# Patient Record
Sex: FEMALE | Race: WHITE | NOT HISPANIC OR LATINO | Employment: FULL TIME | URBAN - METROPOLITAN AREA
[De-identification: names, ages, dates, MRNs, and addresses within clinical notes are randomized per-mention and may not be internally consistent; named-entity substitution may affect disease eponyms.]

---

## 2024-11-25 ENCOUNTER — OFFICE VISIT (OUTPATIENT)
Dept: URGENT CARE | Facility: CLINIC | Age: 64
End: 2024-11-25
Payer: COMMERCIAL

## 2024-11-25 VITALS
TEMPERATURE: 97.5 F | DIASTOLIC BLOOD PRESSURE: 82 MMHG | SYSTOLIC BLOOD PRESSURE: 134 MMHG | RESPIRATION RATE: 12 BRPM | OXYGEN SATURATION: 99 % | HEART RATE: 60 BPM

## 2024-11-25 DIAGNOSIS — J01.00 ACUTE NON-RECURRENT MAXILLARY SINUSITIS: Primary | ICD-10-CM

## 2024-11-25 PROCEDURE — 99213 OFFICE O/P EST LOW 20 MIN: CPT | Performed by: PHYSICIAN ASSISTANT

## 2024-11-25 RX ORDER — METOPROLOL SUCCINATE 25 MG/1
12.5 TABLET, EXTENDED RELEASE ORAL
COMMUNITY

## 2024-11-25 RX ORDER — BUPROPION HYDROCHLORIDE 300 MG/1
300 TABLET ORAL
COMMUNITY

## 2024-11-25 RX ORDER — AMOXICILLIN 875 MG/1
875 TABLET, FILM COATED ORAL 2 TIMES DAILY
Qty: 20 TABLET | Refills: 0 | Status: SHIPPED | OUTPATIENT
Start: 2024-11-25 | End: 2024-12-05

## 2024-11-25 ASSESSMENT — ENCOUNTER SYMPTOMS
SHORTNESS OF BREATH: 1
ACTIVITY CHANGE: 0
COUGH: 1
DIARRHEA: 0
SINUS PRESSURE: 1
APPETITE CHANGE: 0
NAUSEA: 0
FEVER: 1
SINUS PAIN: 1
CHILLS: 1

## 2024-11-25 NOTE — PROGRESS NOTES
Subjective   Patient ID: Lisa Abarca is a 64 y.o. female.      Cough  Associated symptoms include chills, a fever and shortness of breath. Pertinent negatives include no ear pain or rash.   Shortness of Breath  Associated symptoms: cough and fever    Associated symptoms: no ear pain and no rash        The following portions of the chart were reviewed this encounter and updated as appropriate:       Patient seen and examined at bedside today.  Patient presents today with a cough and congestion that has been ongoing for several weeks.  She is having a lot of sinus pressure and blowing out yellow-green sputum that is blood-tinged.  Patient has worked in the healthcare field in the past.  She is concerned that she does have a sinus infection.  Over-the-counter medications have not been helping and she is seeking evaluation today.    Review of Systems   Constitutional:  Positive for chills and fever. Negative for activity change and appetite change.   HENT:  Positive for sinus pressure and sinus pain. Negative for ear pain.    Respiratory:  Positive for cough and shortness of breath.    Gastrointestinal:  Negative for diarrhea and nausea.   Skin:  Negative for rash.     Objective   Physical Exam  Constitutional:       Appearance: Normal appearance.   HENT:      Head: Normocephalic and atraumatic.      Right Ear: Tympanic membrane normal.      Left Ear: Tympanic membrane normal.      Nose: Nose normal.      Mouth/Throat:      Mouth: Mucous membranes are dry.   Cardiovascular:      Rate and Rhythm: Normal rate and regular rhythm.   Pulmonary:      Effort: Pulmonary effort is normal.      Breath sounds: Normal breath sounds.   Skin:     General: Skin is warm and dry.   Neurological:      Mental Status: She is alert.       Procedures    Assessment/Plan   Diagnoses and all orders for this visit:  Acute non-recurrent maxillary sinusitis  -     amoxicillin (Amoxil) 875 mg tablet; Take 1 tablet (875 mg) by mouth 2 times a  day for 10 days.    Patient presents today with acute sinus infection.  Patient will be given a course of amoxicillin.  Patient has had ongoing symptoms for several weeks now that are unresolving.  Patient may take over-the-counter medications to assist with symptom management.  If symptoms do not improve return to clinic or follow-up with primary care physician.      Patient disposition: Home

## 2025-01-17 ENCOUNTER — OFFICE VISIT (OUTPATIENT)
Dept: URGENT CARE | Facility: CLINIC | Age: 65
End: 2025-01-17
Payer: COMMERCIAL

## 2025-01-17 ENCOUNTER — TELEPHONE (OUTPATIENT)
Dept: URGENT CARE | Facility: CLINIC | Age: 65
End: 2025-01-17

## 2025-01-17 VITALS
RESPIRATION RATE: 17 BRPM | DIASTOLIC BLOOD PRESSURE: 78 MMHG | SYSTOLIC BLOOD PRESSURE: 120 MMHG | HEIGHT: 63 IN | TEMPERATURE: 97.8 F | OXYGEN SATURATION: 98 % | BODY MASS INDEX: 26.58 KG/M2 | WEIGHT: 150 LBS | HEART RATE: 74 BPM

## 2025-01-17 DIAGNOSIS — J10.1 INFLUENZA A: Primary | ICD-10-CM

## 2025-01-17 DIAGNOSIS — H65.93 FLUID LEVEL BEHIND TYMPANIC MEMBRANE OF BOTH EARS: ICD-10-CM

## 2025-01-17 DIAGNOSIS — B34.9 NONSPECIFIC SYNDROME SUGGESTIVE OF VIRAL ILLNESS: ICD-10-CM

## 2025-01-17 LAB
POC CORONAVIRUS 2019 BY PCR (COV19): NOT DETECTED
POC FLU A RESULT: DETECTED
POC FLU B RESULT: NOT DETECTED
POC RSV PCR: NOT DETECTED

## 2025-01-17 PROCEDURE — 99213 OFFICE O/P EST LOW 20 MIN: CPT

## 2025-01-17 RX ORDER — FLUTICASONE PROPIONATE 50 MCG
1 SPRAY, SUSPENSION (ML) NASAL DAILY
Qty: 16 G | Refills: 0 | Status: SHIPPED | OUTPATIENT
Start: 2025-01-17 | End: 2025-01-24

## 2025-01-17 NOTE — PROGRESS NOTES
[facility] NOTE:      Name: Lisa Abarca, 64 y.o.    CSN:[id number]   MRN:[id number]    PCP: Perla Stewart MD    ALL:    Allergies   Allergen Reactions    Clarithromycin Other, Diarrhea and GI Upset    Moxifloxacin Diarrhea and GI Upset       History:    Chief Complaint: covid positive (Covid positive test at home with a positive at home flu test. Cough, congestion, fatigue, and loss of voice for 1 week.)    Encounter Date: 1/17/2025      HPI: The history was obtained from the patient. Lisa is a 64 y.o. female, who presents with a chief complaint of covid positive (Covid positive test at home with a positive at home flu test. Cough, congestion, fatigue, and loss of voice for 1 week.).  Patient endorses cough, congestion, fatigue, hoarseness, low-grade fevers otalgia for the last 6 days.  Patient states on Saturday, 1/11 she took an at home COVID test which was positive.  She did inform her primary care provider about this.  They did prescribe her Paxlovid on Tuesday, 1/14.  She is on day 4.  She states on Wednesday, 1/13 she repeated at home COVID testing which also tested for flu A and B.  She states her COVID portion was negative but the flu A part was positive.  She states her symptoms have continued.  She denies nausea, vomiting, headache, sore throat, chest pain, shortness of breath, abdominal pain.    PMHx:    Past Medical History:   Diagnosis Date    Encounter for other screening for malignant neoplasm of breast 02/08/2021    Breast cancer screening    Encounter for screening for malignant neoplasm of cervix 02/08/2021    Cervical cancer screening    Other chest pain 02/26/2021    Atypical chest pain              Current Outpatient Medications   Medication Sig Dispense Refill    buPROPion XL (Wellbutrin XL) 300 mg 24 hr tablet Take 1 tablet (300 mg) by mouth.      fluticasone (Flonase) 50 mcg/actuation nasal spray Administer 1 spray into each nostril once daily for 7  days. Shake gently. Before first use, prime pump. After use, clean tip and replace cap. 16 g 0    metoprolol succinate XL (Toprol-XL) 25 mg 24 hr tablet Take 0.5 tablets (12.5 mg) by mouth.       No current facility-administered medications for this visit.         PMSx:    Past Surgical History:   Procedure Laterality Date    OTHER SURGICAL HISTORY  2021     section    OTHER SURGICAL HISTORY  2021    Flexor tendon repair    OTHER SURGICAL HISTORY  2021    Tonsillectomy    OTHER SURGICAL HISTORY  2021    Lumpectomy       Fam Hx: No family history on file.    SOC. Hx:     Social History     Socioeconomic History    Marital status:      Spouse name: Not on file    Number of children: Not on file    Years of education: Not on file    Highest education level: Not on file   Occupational History    Not on file   Tobacco Use    Smoking status: Not on file    Smokeless tobacco: Not on file   Substance and Sexual Activity    Alcohol use: Not on file    Drug use: Not on file    Sexual activity: Not on file   Other Topics Concern    Not on file   Social History Narrative    Not on file     Social Drivers of Health     Financial Resource Strain: Not on file   Food Insecurity: Not on file   Transportation Needs: Not on file   Physical Activity: Not on file   Stress: Not on file   Social Connections: Not on file   Intimate Partner Violence: Not on file   Housing Stability: Not on file         Vitals:    25 1151   BP: 120/78   Pulse: 74   Resp: 17   Temp: 36.6 °C (97.8 °F)   SpO2: 98%     68 kg (150 lb)          Physical Exam  Vitals reviewed.   Constitutional:       General: She is not in acute distress.     Appearance: Normal appearance. She is not ill-appearing or toxic-appearing.   HENT:      Right Ear: A middle ear effusion is present. Tympanic membrane is not perforated, erythematous, retracted or bulging.      Left Ear: A middle ear effusion is present. Tympanic membrane is not  perforated, erythematous, retracted or bulging.      Nose: Congestion present.      Right Sinus: No maxillary sinus tenderness or frontal sinus tenderness.      Left Sinus: No maxillary sinus tenderness or frontal sinus tenderness.      Mouth/Throat:      Mouth: Mucous membranes are moist.      Pharynx: Oropharynx is clear. Uvula midline. No pharyngeal swelling, posterior oropharyngeal erythema or uvula swelling.      Tonsils: No tonsillar exudate.   Eyes:      Extraocular Movements: Extraocular movements intact.      Conjunctiva/sclera: Conjunctivae normal.      Pupils: Pupils are equal, round, and reactive to light.   Cardiovascular:      Rate and Rhythm: Normal rate and regular rhythm.      Pulses: Normal pulses.      Heart sounds: Normal heart sounds.   Pulmonary:      Effort: Pulmonary effort is normal. No tachypnea, bradypnea, accessory muscle usage, prolonged expiration, respiratory distress or retractions.      Breath sounds: Normal breath sounds and air entry. No stridor, decreased air movement or transmitted upper airway sounds. No decreased breath sounds, wheezing, rhonchi or rales.   Abdominal:      General: Abdomen is flat.      Palpations: Abdomen is soft.   Musculoskeletal:      Cervical back: Full passive range of motion without pain, normal range of motion and neck supple.   Lymphadenopathy:      Cervical: No cervical adenopathy.   Skin:     General: Skin is warm.      Capillary Refill: Capillary refill takes less than 2 seconds.      Findings: No rash.   Neurological:      General: No focal deficit present.      Mental Status: She is alert and oriented to person, place, and time.   Psychiatric:         Mood and Affect: Mood normal.         Behavior: Behavior normal.           I did personally review Lisa's past medical history, surgical history, social history, as well as family history (when relevant).  In this case, I also oversaw the her drug management by reviewing her medication list,  allergy list, as well as the medications that I prescribed during the UC course and/or recommended as an out-patient (including possible OTC medications such as acetaminophen, NSAIDs , etc).    After reviewing the items above, I did look at previous medical documentation, such as recent hospitalizations, office visits, and/or recent consultations with PCP/specialist.                          SDOH:   Another factor that I considered in Lisa's care was her Social Determinants of Health (SDOH). During this UC encounter, she did not have social determinants of health. Those SDOH influencing Lisa's care are: none    LABORATORY @ RADIOLOGICAL IMAGING (if done):     Results for orders placed or performed in visit on 01/17/25 (from the past 24 hours)   POCT SARS-COV-2/FLU/RSV PCR SYMPTOMATIC manually resulted   Result Value Ref Range    POC Coronavirus 2019, PCR Not Detected Not Detected    POC Flu A Result Detected (A) Not Detected    POC Flu B Result Not Detected Not Detected    POC RSV PCR Not Detected Not Detected       UC COURSE/MEDICAL DECISION MAKING:    Lisa is a 64 y.o., who presents with a working diagnosis of   1. Influenza A    2. Nonspecific syndrome suggestive of viral illness    3. Fluid level behind tympanic membrane of both ears      Lisa was seen today for covid positive.  Diagnoses and all orders for this visit:  Influenza A (Primary)  Nonspecific syndrome suggestive of viral illness  -     POCT SARS-COV-2/FLU/RSV PCR SYMPTOMATIC manually resulted  Fluid level behind tympanic membrane of both ears  -     fluticasone (Flonase) 50 mcg/actuation nasal spray; Administer 1 spray into each nostril once daily for 7 days. Shake gently. Before first use, prime pump. After use, clean tip and replace cap.    Lisa Abarca is a 64-year-old female who presents to the urgent care today for cough, congestion, fatigue, hoarseness, low-grade fevers for the last 6 days.  Patient states on Saturday, 1/11 she took an  at home COVID test which was positive.  She did inform her primary care provider about this.  They did prescribe her Paxlovid on Tuesday, 1/14.  She is on day 4.  She states on Wednesday, 1/13 she repeated at home COVID testing which also tested for flu A and B.  She states her COVID portion was negative but the flu A part was positive.  She is concerned because symptoms have persisted.  Physical exam patient is afebrile and nontachycardic.  She is not hypoxic.  Lung sounds are clear to auscultation without wheezes, rhonchi or rales.  Patient does have nasal congestion and bilateral middle ear effusions.  No signs of AOM.  She is nontoxic-appearing.  I did offer patient retesting for COVID and flu as it is less common but possible to be positive for both viruses at the same time.  Patient would like this retesting done today.  Patient was negative for COVID and flu B but she was positive for flu A.  I did offer patient a chest x-ray to evaluate for pneumonia although I do have low suspicion as she is not hypoxic and lung sounds were clear.  Patient declined at this time.  States she will get a chest x-ray if symptoms persist or worsen.  Encourage patient to push fluids.  She may use over-the-counter DayQuil or Mucinex for symptom relief.  Will send in Flonase to her pharmacy to help with nasal congestion and a middle ear effusions.  When she follow-up with primary care provider in 72 hours or sooner following today's visit.  Discussed with patient if she develops persistent fevers, chills, nausea, vomiting, cough, chest pain, shortness of breath, abdominal pain, any new or worsening symptoms she should report to the ER immediately.  Patient verbalized understanding is agreeable this plan.  Piedad Crockett PA-C   Advanced Practice Provider  [facility]    Please note: Portions of this chart may have been created with Dragon voice recognition software. Occasional  "wrong-word or \"sound-like\" substitutions may have occurred due to inherent limitations of the voice recognition software. Please excuse any typographical or grammatical errors contained herein. Please read the chart carefully and recognize, using context, where the substitutions have occurred.   "

## 2025-01-17 NOTE — TELEPHONE ENCOUNTER
Result Communication    No results found from the In Basket message.    2:01 PM      Results were not successfully communicated with the patient and they did not acknowledgeResult Communication    No results found from the In Basket message.    2:01 PM      Results were not successfully communicated with the patient and they did not acknowledge their understanding.   their understanding.

## 2025-05-07 ENCOUNTER — APPOINTMENT (OUTPATIENT)
Dept: PRIMARY CARE | Facility: CLINIC | Age: 65
End: 2025-05-07
Payer: COMMERCIAL

## 2025-06-21 ENCOUNTER — OFFICE VISIT (OUTPATIENT)
Dept: URGENT CARE | Facility: CLINIC | Age: 65
End: 2025-06-21
Payer: COMMERCIAL

## 2025-06-21 ENCOUNTER — HOSPITAL ENCOUNTER (OUTPATIENT)
Dept: RADIOLOGY | Facility: CLINIC | Age: 65
Discharge: HOME | End: 2025-06-21
Payer: COMMERCIAL

## 2025-06-21 VITALS
SYSTOLIC BLOOD PRESSURE: 145 MMHG | HEART RATE: 63 BPM | TEMPERATURE: 96.3 F | DIASTOLIC BLOOD PRESSURE: 80 MMHG | RESPIRATION RATE: 12 BRPM | OXYGEN SATURATION: 99 %

## 2025-06-21 DIAGNOSIS — J30.2 SEASONAL ALLERGIES: ICD-10-CM

## 2025-06-21 DIAGNOSIS — J02.9 SORE THROAT: ICD-10-CM

## 2025-06-21 DIAGNOSIS — R05.1 ACUTE COUGH: ICD-10-CM

## 2025-06-21 DIAGNOSIS — J20.9 ACUTE BRONCHITIS, UNSPECIFIED ORGANISM: Primary | ICD-10-CM

## 2025-06-21 LAB — POC GROUP A STREP, PCR: NOT DETECTED

## 2025-06-21 PROCEDURE — 71046 X-RAY EXAM CHEST 2 VIEWS: CPT | Performed by: RADIOLOGY

## 2025-06-21 PROCEDURE — 87651 STREP A DNA AMP PROBE: CPT | Mod: QW | Performed by: NURSE PRACTITIONER

## 2025-06-21 PROCEDURE — 99213 OFFICE O/P EST LOW 20 MIN: CPT | Mod: 25 | Performed by: NURSE PRACTITIONER

## 2025-06-21 PROCEDURE — 71046 X-RAY EXAM CHEST 2 VIEWS: CPT

## 2025-06-21 RX ORDER — PREDNISONE 10 MG/1
TABLET ORAL
Qty: 21 TABLET | Refills: 0 | Status: SHIPPED | OUTPATIENT
Start: 2025-06-21 | End: 2025-06-27

## 2025-06-21 RX ORDER — ALBUTEROL SULFATE 90 UG/1
2 INHALANT RESPIRATORY (INHALATION) EVERY 6 HOURS PRN
Qty: 18 G | Refills: 0 | Status: SHIPPED | OUTPATIENT
Start: 2025-06-21 | End: 2026-06-21

## 2025-06-21 NOTE — PROGRESS NOTES
64 y.o. female presents for evaluation of cough that has been present since January when she had influenza A. States the past 2 days she developed a sore throat. Occasionally will cough up clear sputum and also has clear nasal drainage occasionally. States cough began to improve but then seasonal allergies flare and cough worsens again.  Does take Zyrtec but reports she does not take it daily as she should.  Denies chest pains, fevers, shortness of breath at rest, nausea, vomiting, diarrhea, body aches, fatigue, swelling or any other associated similar complaint.  No other OTC meds symptom management.  No history of asthma or any other lung disease.  No history of CHF.  No other complaints.      Vitals:    25 1232   BP: 145/80   Pulse: 63   Resp: 12   Temp: 35.7 °C (96.3 °F)   SpO2: 99%       RX Allergies[1]    Medication Documentation Review Audit       Reviewed by Svetlana Benson MA (Medical Assistant) on 25 at 1231      Medication Order Taking? Sig Documenting Provider Last Dose Status   buPROPion XL (Wellbutrin XL) 300 mg 24 hr tablet [id number] Yes Take 1 tablet (300 mg) by mouth. Historical Provider, MD  Active   fluticasone (Flonase) 50 mcg/actuation nasal spray [id number]  Administer 1 spray into each nostril once daily for 7 days. Shake gently. Before first use, prime pump. After use, clean tip and replace cap. Nancy Crockett PA-C   25 2359   metoprolol succinate XL (Toprol-XL) 25 mg 24 hr tablet [id number] Yes Take 0.5 tablets (12.5 mg) by mouth. Historical Provider, MD  Active                    Medical History[2]    Surgical History[3]    ROS  See HPI    Physical Exam  Vitals and nursing note reviewed.   Constitutional:       Appearance: Normal appearance. She is normal weight.   HENT:      Head: Normocephalic and atraumatic.      Right Ear: Tympanic membrane and ear canal normal.      Left Ear: Tympanic membrane and ear canal normal.      Nose: Nose normal.       Mouth/Throat:      Mouth: Mucous membranes are moist.      Pharynx: Oropharynx is clear.   Eyes:      Extraocular Movements: Extraocular movements intact.      Conjunctiva/sclera: Conjunctivae normal.      Pupils: Pupils are equal, round, and reactive to light.   Cardiovascular:      Rate and Rhythm: Normal rate and regular rhythm.      Heart sounds: Normal heart sounds. No murmur heard.     No gallop.   Pulmonary:      Effort: Pulmonary effort is normal.      Breath sounds: Normal breath sounds.      Comments: Dry cough  Skin:     General: Skin is warm and dry.      Capillary Refill: Capillary refill takes less than 2 seconds.   Neurological:      General: No focal deficit present.      Mental Status: She is alert and oriented to person, place, and time.   Psychiatric:         Mood and Affect: Mood normal.         Behavior: Behavior normal.       Recent Results (from the past hour)   POCT Group A Streptococcus, PCR manually resulted    Collection Time: 06/21/25  1:10 PM   Result Value Ref Range    POC Group A Strep, PCR Not Detected Not Detected       Assessment/Plan/MDM  Lisa was seen today for cough.  Diagnoses and all orders for this visit:  Acute bronchitis, unspecified organism (Primary)  -     XR chest 2 views; Future  -     predniSONE (Deltasone) 10 mg tablet; Take 6 tablets (60 mg) by mouth once daily for 1 day, THEN 5 tablets (50 mg) once daily for 1 day, THEN 4 tablets (40 mg) once daily for 1 day, THEN 3 tablets (30 mg) once daily for 1 day, THEN 2 tablets (20 mg) once daily for 1 day, THEN 1 tablet (10 mg) once daily for 1 day.  -     albuterol 90 mcg/actuation inhaler; Inhale 2 puffs every 6 hours if needed for wheezing.  Seasonal allergies  -     predniSONE (Deltasone) 10 mg tablet; Take 6 tablets (60 mg) by mouth once daily for 1 day, THEN 5 tablets (50 mg) once daily for 1 day, THEN 4 tablets (40 mg) once daily for 1 day, THEN 3 tablets (30 mg) once daily for 1 day, THEN 2 tablets (20 mg) once  daily for 1 day, THEN 1 tablet (10 mg) once daily for 1 day.  Sore throat  -     POCT Group A Streptococcus, PCR manually resulted    Encouraged patient to use antihistamines daily. If no improvement in symptoms patient should follow up with PCP for further eval of cough. CXR clear today.  Encouraged pt to use otc cough suppressants, push PO fluids and rest. Patient's clinical presentation is otherwise unremarkable at this time. Patient is discharged with instructions to follow-up with primary care or seek emergency medical attention for worsening symptoms or any new concerns.    I did personally review Lisa's past medical history, surgical history, social history, as well as family history (when relevant).  In this case, I also oversaw the her drug management by reviewing her medication list, allergy list, as well as the medications that I prescribed during the UC course and/or recommended as an out-patient (including possible OTC medications such as acetaminophen, NSAIDs , etc).    After reviewing the items above, I did look at previous medical documentation, such as recent hospitalizations, office visits, and/or recent consultations with PCP/specialist.                          SDOH:   Another factor that I considered in Lisa's care was her Social Determinants of Health (SDOH). During this UC encounter, she did not have social determinants of health. Those SDOH influencing Lisa's care are: none      Vernon Lino CNP  [facility]         [1]   Allergies  Allergen Reactions    Clarithromycin Other, Diarrhea and GI Upset    Moxifloxacin Diarrhea and GI Upset   [2]   Past Medical History:  Diagnosis Date    Encounter for other screening for malignant neoplasm of breast 02/08/2021    Breast cancer screening    Encounter for screening for malignant neoplasm of cervix 02/08/2021    Cervical cancer screening    Other chest pain 02/26/2021    Atypical chest pain   [3]   Past  Surgical History:  Procedure Laterality Date    OTHER SURGICAL HISTORY  2021     section    OTHER SURGICAL HISTORY  2021    Flexor tendon repair    OTHER SURGICAL HISTORY  2021    Tonsillectomy    OTHER SURGICAL HISTORY  2021    Lumpectomy

## 2025-06-30 ENCOUNTER — APPOINTMENT (OUTPATIENT)
Dept: PRIMARY CARE | Facility: CLINIC | Age: 65
End: 2025-06-30
Payer: COMMERCIAL

## 2025-06-30 VITALS
HEIGHT: 63 IN | WEIGHT: 148 LBS | HEART RATE: 72 BPM | BODY MASS INDEX: 26.22 KG/M2 | DIASTOLIC BLOOD PRESSURE: 78 MMHG | SYSTOLIC BLOOD PRESSURE: 118 MMHG

## 2025-06-30 DIAGNOSIS — R00.2 PALPITATIONS: ICD-10-CM

## 2025-06-30 DIAGNOSIS — Z13.6 SCREENING FOR HEART DISEASE: ICD-10-CM

## 2025-06-30 DIAGNOSIS — Z12.11 ENCOUNTER FOR SCREENING FOR MALIGNANT NEOPLASM OF COLON: ICD-10-CM

## 2025-06-30 DIAGNOSIS — R05.3 CHRONIC COUGH: ICD-10-CM

## 2025-06-30 DIAGNOSIS — J30.2 SEASONAL ALLERGIES: Primary | ICD-10-CM

## 2025-06-30 DIAGNOSIS — F32.0 CURRENT MILD EPISODE OF MAJOR DEPRESSIVE DISORDER WITHOUT PRIOR EPISODE: ICD-10-CM

## 2025-06-30 DIAGNOSIS — R79.89 ABNORMAL THYROID BLOOD TEST: ICD-10-CM

## 2025-06-30 DIAGNOSIS — E78.5 DYSLIPIDEMIA: ICD-10-CM

## 2025-06-30 DIAGNOSIS — E55.9 VITAMIN D DEFICIENCY: ICD-10-CM

## 2025-06-30 PROCEDURE — 1036F TOBACCO NON-USER: CPT | Performed by: STUDENT IN AN ORGANIZED HEALTH CARE EDUCATION/TRAINING PROGRAM

## 2025-06-30 PROCEDURE — 99204 OFFICE O/P NEW MOD 45 MIN: CPT | Performed by: STUDENT IN AN ORGANIZED HEALTH CARE EDUCATION/TRAINING PROGRAM

## 2025-06-30 PROCEDURE — 3008F BODY MASS INDEX DOCD: CPT | Performed by: STUDENT IN AN ORGANIZED HEALTH CARE EDUCATION/TRAINING PROGRAM

## 2025-06-30 RX ORDER — MONTELUKAST SODIUM 10 MG/1
10 TABLET ORAL NIGHTLY
Qty: 90 TABLET | Refills: 1 | Status: SHIPPED | OUTPATIENT
Start: 2025-06-30

## 2025-06-30 ASSESSMENT — PATIENT HEALTH QUESTIONNAIRE - PHQ9
1. LITTLE INTEREST OR PLEASURE IN DOING THINGS: NOT AT ALL
2. FEELING DOWN, DEPRESSED OR HOPELESS: NOT AT ALL
SUM OF ALL RESPONSES TO PHQ9 QUESTIONS 1 AND 2: 0

## 2025-06-30 NOTE — PROGRESS NOTES
Subjective   Patient ID: Emili Abarca is a 65 y.o. female who presents for establish care. Previous provider Dr Stewart. Chronic cough that's been that's been at least 6-12 months.    HPI  Palpitations - managed on ToprolXL 12.5mg every day, well-controlled and largely asymptomatiwc with BB, symptoms more frequent with stress, she denies associated LH, dizziness, CP, she is considering trying to hold the medication    Depression - managed on Wellbutrin 300mg every day, feeling well from a mental health standpoint at this time    Chronic cough - notes dry cough occasionally productive of clear sticky sputum, states that she coughs all day long, not usually productive, was evaluated in Urgent Care about a week ago, strep and cxr neg, was rx prednisone taper and albuterol inhaler, hasn't used inhaler, she notes random SOB, has not been able to elicit any triggers, Zyrtec helps a little with drainage, Flonase doesn't seem to last long enough, allergy symptoms are worst summer to fall and mid winter, she denies heartburn, did have asthma as a child, she rarely wheezes, has been getting winded more quickly, she has continued to exercise 20-25min at a time, mix of cardio and body weight exercises    Cervical Cancer Screening: [facility], annual scheduled 9/2025  Breast Cancer Screening: due 9/2025  Colon Cancer Screening: denies, asymptomatic, no fhx colon cancer, due, she prefers Cologuard  Tobacco: never smoker  Alcohol: denies  Recreational Drugs: denies  Immunizations: she will consider Shingrix and TDaP, declines [id number] and influenza    Review of Systems   Constitutional:  Negative for chills and fever.   HENT:  Negative for congestion and rhinorrhea.    Eyes:  Negative for redness.   Respiratory:  Positive for cough and shortness of breath. Negative for wheezing.    Cardiovascular:  Positive for palpitations. Negative for chest pain and leg swelling.   Gastrointestinal:  Negative for abdominal pain, blood in stool,  "constipation, diarrhea, nausea and vomiting.   Genitourinary:  Negative for dysuria.   Musculoskeletal:  Negative for arthralgias and myalgias.   Skin:  Negative for rash.   Neurological:  Negative for dizziness and headaches.   Psychiatric/Behavioral:  Negative for dysphoric mood and sleep disturbance. The patient is not nervous/anxious.      Objective   /78   Pulse 72   Ht 1.6 m (5' 3\")   Wt 67.1 kg (148 lb)   BMI 26.22 kg/m²     Physical Exam  Vitals reviewed.   Constitutional:       General: She is not in acute distress.     Appearance: Normal appearance.   HENT:      Head: Normocephalic and atraumatic.     Eyes:      General: No scleral icterus.     Conjunctiva/sclera: Conjunctivae normal.       Cardiovascular:      Rate and Rhythm: Normal rate and regular rhythm.      Heart sounds: No murmur heard.  Pulmonary:      Effort: Pulmonary effort is normal. No respiratory distress.      Breath sounds: Normal breath sounds.   Abdominal:      General: Bowel sounds are normal. There is no distension.      Palpations: Abdomen is soft.      Tenderness: There is no abdominal tenderness. There is no guarding or rebound.     Musculoskeletal:         General: No swelling or deformity.      Cervical back: Normal range of motion and neck supple.     Skin:     General: Skin is warm and dry.      Findings: No rash.     Neurological:      General: No focal deficit present.      Mental Status: She is alert.     Psychiatric:         Mood and Affect: Mood normal.         Behavior: Behavior normal.       Assessment/Plan   Problem List Items Addressed This Visit           ICD-10-CM    Vitamin D deficiency E55.9    - Will check level         Relevant Orders    Vitamin D 25-Hydroxy,Total (for eval of Vitamin D levels) (Completed)    Follow Up In Primary Care - Established    Seasonal allergies - Primary J30.2    - We reviewed usual tx  - She will continue antihistamine and nasal steroid  - Using shared decision making, we " decided to add Singulair  - Medication dosing and side effects reviewed.  - We also decided to proceed with allergy referral         Relevant Medications    montelukast (Singulair) 10 mg tablet    Other Relevant Orders    Referral to Allergy    Follow Up In Primary Care - Established    Palpitations R00.2    - Managed on Toprol-XL 12.5mg every day  - Well-controlled  - She is considering holding her BB         Dyslipidemia E78.5    - Dietary modifications and recommendation for 150 minutes of moderate-intensity exercise per week reviewed.  - We will update labs and will follow up with results  - Using shared decision making, we also decided to proceed with CT cardiac scoring for further risk stratification         Relevant Orders    CBC and Auto Differential (Completed)    Comprehensive Metabolic Panel (Completed)    Lipid Panel (Completed)    Follow Up In Primary Care - Established    Current mild episode of major depressive disorder without prior episode F32.0    - Managed on Wellbutrin 300mg every day  - Well-controlled         Chronic cough R05.3    - I suspect allergic component is large contributor to her cough  - She will continue antihistamine and nasal steroid  - We will add Singulair  - Return precautions reviewed.           Other Visit Diagnoses         Codes      Screening for heart disease     Z13.6    Relevant Orders    CT cardiac scoring wo IV contrast    Follow Up In Primary Care - Established      Encounter for screening for malignant neoplasm of colon     Z12.11    Relevant Orders    Cologuard® colon cancer screening    Follow Up In Primary Care - Established      Abnormal thyroid blood test     R79.89    Relevant Orders    TSH (Completed)    T4, free (Completed)    T3, free (Completed)        Follow up in 3mo for recheck, sooner if needed.

## 2025-07-01 LAB
25(OH)D3+25(OH)D2 SERPL-MCNC: 39 NG/ML (ref 30–100)
ALBUMIN SERPL-MCNC: 4.5 G/DL (ref 3.6–5.1)
ALP SERPL-CCNC: 76 U/L (ref 37–153)
ALT SERPL-CCNC: 10 U/L (ref 6–29)
ANION GAP SERPL CALCULATED.4IONS-SCNC: 10 MMOL/L (CALC) (ref 7–17)
AST SERPL-CCNC: 15 U/L (ref 10–35)
BASOPHILS # BLD AUTO: 50 CELLS/UL (ref 0–200)
BASOPHILS NFR BLD AUTO: 0.8 %
BILIRUB SERPL-MCNC: 0.5 MG/DL (ref 0.2–1.2)
BUN SERPL-MCNC: 14 MG/DL (ref 7–25)
CALCIUM SERPL-MCNC: 9.3 MG/DL (ref 8.6–10.4)
CHLORIDE SERPL-SCNC: 107 MMOL/L (ref 98–110)
CHOLEST SERPL-MCNC: 232 MG/DL
CHOLEST/HDLC SERPL: 4.5 (CALC)
CO2 SERPL-SCNC: 24 MMOL/L (ref 20–32)
CREAT SERPL-MCNC: 1.21 MG/DL (ref 0.5–1.05)
EGFRCR SERPLBLD CKD-EPI 2021: 51 ML/MIN/1.73M2
EOSINOPHIL # BLD AUTO: 143 CELLS/UL (ref 15–500)
EOSINOPHIL NFR BLD AUTO: 2.3 %
ERYTHROCYTE [DISTWIDTH] IN BLOOD BY AUTOMATED COUNT: 13.1 % (ref 11–15)
GLUCOSE SERPL-MCNC: 96 MG/DL (ref 65–99)
HCT VFR BLD AUTO: 42.4 % (ref 35–45)
HDLC SERPL-MCNC: 52 MG/DL
HGB BLD-MCNC: 13.4 G/DL (ref 11.7–15.5)
LDLC SERPL CALC-MCNC: 150 MG/DL (CALC)
LYMPHOCYTES # BLD AUTO: 2505 CELLS/UL (ref 850–3900)
LYMPHOCYTES NFR BLD AUTO: 40.4 %
MCH RBC QN AUTO: 29.3 PG (ref 27–33)
MCHC RBC AUTO-ENTMCNC: 31.6 G/DL (ref 32–36)
MCV RBC AUTO: 92.6 FL (ref 80–100)
MONOCYTES # BLD AUTO: 657 CELLS/UL (ref 200–950)
MONOCYTES NFR BLD AUTO: 10.6 %
NEUTROPHILS # BLD AUTO: 2846 CELLS/UL (ref 1500–7800)
NEUTROPHILS NFR BLD AUTO: 45.9 %
NONHDLC SERPL-MCNC: 180 MG/DL (CALC)
PLATELET # BLD AUTO: 288 THOUSAND/UL (ref 140–400)
PMV BLD REES-ECKER: 9.8 FL (ref 7.5–12.5)
POTASSIUM SERPL-SCNC: 4.1 MMOL/L (ref 3.5–5.3)
PROT SERPL-MCNC: 7.1 G/DL (ref 6.1–8.1)
RBC # BLD AUTO: 4.58 MILLION/UL (ref 3.8–5.1)
SODIUM SERPL-SCNC: 141 MMOL/L (ref 135–146)
T3FREE SERPL-MCNC: 3.4 PG/ML (ref 2.3–4.2)
T4 FREE SERPL-MCNC: 1.2 NG/DL (ref 0.8–1.8)
TRIGL SERPL-MCNC: 160 MG/DL
TSH SERPL-ACNC: 0.69 MIU/L (ref 0.4–4.5)
WBC # BLD AUTO: 6.2 THOUSAND/UL (ref 3.8–10.8)

## 2025-07-18 PROBLEM — R00.2 PALPITATIONS: Status: ACTIVE | Noted: 2025-07-18

## 2025-07-18 PROBLEM — E78.5 DYSLIPIDEMIA: Status: ACTIVE | Noted: 2025-07-18

## 2025-07-18 PROBLEM — J30.2 SEASONAL ALLERGIES: Status: ACTIVE | Noted: 2025-07-18

## 2025-07-18 PROBLEM — F32.0 CURRENT MILD EPISODE OF MAJOR DEPRESSIVE DISORDER WITHOUT PRIOR EPISODE: Status: ACTIVE | Noted: 2025-07-18

## 2025-07-18 PROBLEM — R05.3 CHRONIC COUGH: Status: ACTIVE | Noted: 2025-07-18

## 2025-07-18 PROBLEM — E55.9 VITAMIN D DEFICIENCY: Status: ACTIVE | Noted: 2025-07-18

## 2025-07-18 ASSESSMENT — ENCOUNTER SYMPTOMS
SHORTNESS OF BREATH: 1
ARTHRALGIAS: 0
MYALGIAS: 0
COUGH: 1
NAUSEA: 0
NERVOUS/ANXIOUS: 0
SLEEP DISTURBANCE: 0
HEADACHES: 0
CONSTIPATION: 0
FEVER: 0
DIARRHEA: 0
RHINORRHEA: 0
ABDOMINAL PAIN: 0
CHILLS: 0
PALPITATIONS: 1
DYSPHORIC MOOD: 0
DIZZINESS: 0
VOMITING: 0
EYE REDNESS: 0
DYSURIA: 0
WHEEZING: 0
BLOOD IN STOOL: 0

## 2025-07-19 LAB — NONINV COLON CA DNA+OCC BLD SCRN STL QL: NEGATIVE

## 2025-07-19 NOTE — ASSESSMENT & PLAN NOTE
- Dietary modifications and recommendation for 150 minutes of moderate-intensity exercise per week reviewed.  - We will update labs and will follow up with results  - Using shared decision making, we also decided to proceed with CT cardiac scoring for further risk stratification

## 2025-07-19 NOTE — ASSESSMENT & PLAN NOTE
- I suspect allergic component is large contributor to her cough  - She will continue antihistamine and nasal steroid  - We will add Singulair  - Return precautions reviewed.

## 2025-07-19 NOTE — ASSESSMENT & PLAN NOTE
- We reviewed usual tx  - She will continue antihistamine and nasal steroid  - Using shared decision making, we decided to add Singulair  - Medication dosing and side effects reviewed.  - We also decided to proceed with allergy referral

## 2025-07-20 ENCOUNTER — RESULTS FOLLOW-UP (OUTPATIENT)
Dept: PRIMARY CARE | Facility: CLINIC | Age: 65
End: 2025-07-20
Payer: COMMERCIAL

## 2025-07-20 PROBLEM — R94.4 DECREASED GFR: Status: ACTIVE | Noted: 2025-07-20

## 2025-08-05 DIAGNOSIS — Z12.31 ENCOUNTER FOR SCREENING MAMMOGRAM FOR BREAST CANCER: ICD-10-CM

## 2025-08-08 ENCOUNTER — HOSPITAL ENCOUNTER (OUTPATIENT)
Dept: RADIOLOGY | Facility: HOSPITAL | Age: 65
Discharge: HOME | End: 2025-08-08
Payer: COMMERCIAL

## 2025-08-08 DIAGNOSIS — Z13.6 SCREENING FOR HEART DISEASE: ICD-10-CM

## 2025-08-08 PROCEDURE — 75571 CT HRT W/O DYE W/CA TEST: CPT

## 2025-09-30 ENCOUNTER — APPOINTMENT (OUTPATIENT)
Dept: PRIMARY CARE | Facility: CLINIC | Age: 65
End: 2025-09-30
Payer: COMMERCIAL